# Patient Record
Sex: MALE | Race: WHITE | Employment: OTHER | URBAN - METROPOLITAN AREA
[De-identification: names, ages, dates, MRNs, and addresses within clinical notes are randomized per-mention and may not be internally consistent; named-entity substitution may affect disease eponyms.]

---

## 2020-08-11 ENCOUNTER — TRANSFERRED RECORDS (OUTPATIENT)
Dept: HEALTH INFORMATION MANAGEMENT | Facility: CLINIC | Age: 85
End: 2020-08-11

## 2020-08-26 ENCOUNTER — TRANSFERRED RECORDS (OUTPATIENT)
Dept: HEALTH INFORMATION MANAGEMENT | Facility: CLINIC | Age: 85
End: 2020-08-26

## 2020-09-08 DIAGNOSIS — Z11.59 ENCOUNTER FOR SCREENING FOR OTHER VIRAL DISEASES: Primary | ICD-10-CM

## 2020-09-09 ENCOUNTER — TRANSFERRED RECORDS (OUTPATIENT)
Dept: HEALTH INFORMATION MANAGEMENT | Facility: CLINIC | Age: 85
End: 2020-09-09

## 2020-09-17 DIAGNOSIS — Z11.59 ENCOUNTER FOR SCREENING FOR OTHER VIRAL DISEASES: ICD-10-CM

## 2020-09-17 PROCEDURE — U0003 INFECTIOUS AGENT DETECTION BY NUCLEIC ACID (DNA OR RNA); SEVERE ACUTE RESPIRATORY SYNDROME CORONAVIRUS 2 (SARS-COV-2) (CORONAVIRUS DISEASE [COVID-19]), AMPLIFIED PROBE TECHNIQUE, MAKING USE OF HIGH THROUGHPUT TECHNOLOGIES AS DESCRIBED BY CMS-2020-01-R: HCPCS | Performed by: ORTHOPAEDIC SURGERY

## 2020-09-18 LAB
SARS-COV-2 RNA SPEC QL NAA+PROBE: NOT DETECTED
SPECIMEN SOURCE: NORMAL

## 2020-09-18 RX ORDER — FLUTICASONE PROPIONATE 50 MCG
2 SPRAY, SUSPENSION (ML) NASAL DAILY PRN
COMMUNITY

## 2020-09-18 RX ORDER — WARFARIN SODIUM 5 MG/1
2.5 TABLET ORAL DAILY
COMMUNITY

## 2020-09-18 RX ORDER — IPRATROPIUM BROMIDE 21 UG/1
2 SPRAY, METERED NASAL 2 TIMES DAILY
COMMUNITY

## 2020-09-18 RX ORDER — SPIRONOLACTONE 25 MG/1
25 TABLET ORAL 2 TIMES DAILY
COMMUNITY

## 2020-09-18 RX ORDER — WARFARIN SODIUM 5 MG/1
5 TABLET ORAL DAILY
COMMUNITY

## 2020-09-21 ENCOUNTER — DOCUMENTATION ONLY (OUTPATIENT)
Dept: OTHER | Facility: CLINIC | Age: 85
End: 2020-09-21

## 2020-09-21 ENCOUNTER — HOSPITAL ENCOUNTER (OUTPATIENT)
Facility: CLINIC | Age: 85
Discharge: HOME | End: 2020-09-22
Attending: ORTHOPAEDIC SURGERY | Admitting: ORTHOPAEDIC SURGERY
Payer: COMMERCIAL

## 2020-09-21 ENCOUNTER — ANESTHESIA EVENT (OUTPATIENT)
Dept: SURGERY | Facility: CLINIC | Age: 85
End: 2020-09-21
Payer: COMMERCIAL

## 2020-09-21 ENCOUNTER — APPOINTMENT (OUTPATIENT)
Dept: GENERAL RADIOLOGY | Facility: CLINIC | Age: 85
End: 2020-09-21
Attending: ORTHOPAEDIC SURGERY
Payer: COMMERCIAL

## 2020-09-21 ENCOUNTER — ANESTHESIA (OUTPATIENT)
Dept: SURGERY | Facility: CLINIC | Age: 85
End: 2020-09-21
Payer: COMMERCIAL

## 2020-09-21 PROBLEM — M48.062 SPINAL STENOSIS, LUMBAR REGION, WITH NEUROGENIC CLAUDICATION: Status: ACTIVE | Noted: 2020-09-21

## 2020-09-21 LAB
ABO + RH BLD: NORMAL
ABO + RH BLD: NORMAL
BLD GP AB SCN SERPL QL: NORMAL
BLOOD BANK CMNT PATIENT-IMP: NORMAL
INR PPP: 1.11 (ref 0.86–1.14)
SPECIMEN EXP DATE BLD: NORMAL

## 2020-09-21 PROCEDURE — 27210794 ZZH OR GENERAL SUPPLY STERILE: Performed by: ORTHOPAEDIC SURGERY

## 2020-09-21 PROCEDURE — 37000009 ZZH ANESTHESIA TECHNICAL FEE, EACH ADDTL 15 MIN: Performed by: ORTHOPAEDIC SURGERY

## 2020-09-21 PROCEDURE — 25000128 H RX IP 250 OP 636: Performed by: SURGERY

## 2020-09-21 PROCEDURE — 25800030 ZZH RX IP 258 OP 636: Performed by: NURSE ANESTHETIST, CERTIFIED REGISTERED

## 2020-09-21 PROCEDURE — 25000128 H RX IP 250 OP 636: Performed by: ORTHOPAEDIC SURGERY

## 2020-09-21 PROCEDURE — 25000566 ZZH SEVOFLURANE, EA 15 MIN: Performed by: ORTHOPAEDIC SURGERY

## 2020-09-21 PROCEDURE — 86850 RBC ANTIBODY SCREEN: CPT | Performed by: ORTHOPAEDIC SURGERY

## 2020-09-21 PROCEDURE — 25000128 H RX IP 250 OP 636: Performed by: NURSE ANESTHETIST, CERTIFIED REGISTERED

## 2020-09-21 PROCEDURE — 86901 BLOOD TYPING SEROLOGIC RH(D): CPT | Performed by: ORTHOPAEDIC SURGERY

## 2020-09-21 PROCEDURE — 37000008 ZZH ANESTHESIA TECHNICAL FEE, 1ST 30 MIN: Performed by: ORTHOPAEDIC SURGERY

## 2020-09-21 PROCEDURE — 36000063 ZZH SURGERY LEVEL 4 EA 15 ADDTL MIN: Performed by: ORTHOPAEDIC SURGERY

## 2020-09-21 PROCEDURE — 40000170 ZZH STATISTIC PRE-PROCEDURE ASSESSMENT II: Performed by: ORTHOPAEDIC SURGERY

## 2020-09-21 PROCEDURE — 40000985 XR LUMBAR SPINE PORT 1 VW

## 2020-09-21 PROCEDURE — 36415 COLL VENOUS BLD VENIPUNCTURE: CPT | Performed by: ORTHOPAEDIC SURGERY

## 2020-09-21 PROCEDURE — 71000012 ZZH RECOVERY PHASE 1 LEVEL 1 FIRST HR: Performed by: ORTHOPAEDIC SURGERY

## 2020-09-21 PROCEDURE — 25000125 ZZHC RX 250: Performed by: NURSE ANESTHETIST, CERTIFIED REGISTERED

## 2020-09-21 PROCEDURE — 85610 PROTHROMBIN TIME: CPT | Performed by: ORTHOPAEDIC SURGERY

## 2020-09-21 PROCEDURE — 25000132 ZZH RX MED GY IP 250 OP 250 PS 637: Performed by: ORTHOPAEDIC SURGERY

## 2020-09-21 PROCEDURE — 36000093 ZZH SURGERY LEVEL 4 1ST 30 MIN: Performed by: ORTHOPAEDIC SURGERY

## 2020-09-21 PROCEDURE — 86900 BLOOD TYPING SEROLOGIC ABO: CPT | Performed by: ORTHOPAEDIC SURGERY

## 2020-09-21 PROCEDURE — 25000125 ZZHC RX 250: Performed by: ORTHOPAEDIC SURGERY

## 2020-09-21 RX ORDER — FENTANYL CITRATE 50 UG/ML
25-50 INJECTION, SOLUTION INTRAMUSCULAR; INTRAVENOUS
Status: DISCONTINUED | OUTPATIENT
Start: 2020-09-21 | End: 2020-09-21 | Stop reason: HOSPADM

## 2020-09-21 RX ORDER — METOCLOPRAMIDE 5 MG/1
5 TABLET ORAL EVERY 6 HOURS PRN
Status: DISCONTINUED | OUTPATIENT
Start: 2020-09-21 | End: 2020-09-22 | Stop reason: HOSPADM

## 2020-09-21 RX ORDER — DEXAMETHASONE SODIUM PHOSPHATE 4 MG/ML
INJECTION, SOLUTION INTRA-ARTICULAR; INTRALESIONAL; INTRAMUSCULAR; INTRAVENOUS; SOFT TISSUE PRN
Status: DISCONTINUED | OUTPATIENT
Start: 2020-09-21 | End: 2020-09-21

## 2020-09-21 RX ORDER — HYDROMORPHONE HYDROCHLORIDE 1 MG/ML
.3-.5 INJECTION, SOLUTION INTRAMUSCULAR; INTRAVENOUS; SUBCUTANEOUS
Status: DISCONTINUED | OUTPATIENT
Start: 2020-09-21 | End: 2020-09-22 | Stop reason: HOSPADM

## 2020-09-21 RX ORDER — OXYCODONE HYDROCHLORIDE 5 MG/1
5-10 TABLET ORAL EVERY 4 HOURS PRN
Status: DISCONTINUED | OUTPATIENT
Start: 2020-09-21 | End: 2020-09-22 | Stop reason: HOSPADM

## 2020-09-21 RX ORDER — SODIUM CHLORIDE, SODIUM LACTATE, POTASSIUM CHLORIDE, CALCIUM CHLORIDE 600; 310; 30; 20 MG/100ML; MG/100ML; MG/100ML; MG/100ML
INJECTION, SOLUTION INTRAVENOUS CONTINUOUS
Status: DISCONTINUED | OUTPATIENT
Start: 2020-09-21 | End: 2020-09-21 | Stop reason: HOSPADM

## 2020-09-21 RX ORDER — PROPOFOL 10 MG/ML
INJECTION, EMULSION INTRAVENOUS PRN
Status: DISCONTINUED | OUTPATIENT
Start: 2020-09-21 | End: 2020-09-21

## 2020-09-21 RX ORDER — MINOXIDIL 2.5 MG/1
5 TABLET ORAL 2 TIMES DAILY
Status: DISCONTINUED | OUTPATIENT
Start: 2020-09-21 | End: 2020-09-22 | Stop reason: HOSPADM

## 2020-09-21 RX ORDER — VECURONIUM BROMIDE 1 MG/ML
INJECTION, POWDER, LYOPHILIZED, FOR SOLUTION INTRAVENOUS PRN
Status: DISCONTINUED | OUTPATIENT
Start: 2020-09-21 | End: 2020-09-21

## 2020-09-21 RX ORDER — ATENOLOL 100 MG/1
100 TABLET ORAL DAILY
Status: DISCONTINUED | OUTPATIENT
Start: 2020-09-22 | End: 2020-09-22 | Stop reason: HOSPADM

## 2020-09-21 RX ORDER — ACETAMINOPHEN 325 MG/1
650 TABLET ORAL EVERY 4 HOURS PRN
Status: DISCONTINUED | OUTPATIENT
Start: 2020-09-24 | End: 2020-09-22 | Stop reason: HOSPADM

## 2020-09-21 RX ORDER — PROPOFOL 10 MG/ML
INJECTION, EMULSION INTRAVENOUS CONTINUOUS PRN
Status: DISCONTINUED | OUTPATIENT
Start: 2020-09-21 | End: 2020-09-21

## 2020-09-21 RX ORDER — CEFAZOLIN SODIUM 1 G/3ML
1 INJECTION, POWDER, FOR SOLUTION INTRAMUSCULAR; INTRAVENOUS EVERY 8 HOURS
Status: DISCONTINUED | OUTPATIENT
Start: 2020-09-21 | End: 2020-09-21

## 2020-09-21 RX ORDER — NEOSTIGMINE METHYLSULFATE 1 MG/ML
VIAL (ML) INJECTION PRN
Status: DISCONTINUED | OUTPATIENT
Start: 2020-09-21 | End: 2020-09-21

## 2020-09-21 RX ORDER — CEFAZOLIN SODIUM 1 G/3ML
1 INJECTION, POWDER, FOR SOLUTION INTRAMUSCULAR; INTRAVENOUS SEE ADMIN INSTRUCTIONS
Status: DISCONTINUED | OUTPATIENT
Start: 2020-09-21 | End: 2020-09-21 | Stop reason: HOSPADM

## 2020-09-21 RX ORDER — MEPERIDINE HYDROCHLORIDE 25 MG/ML
12.5 INJECTION INTRAMUSCULAR; INTRAVENOUS; SUBCUTANEOUS
Status: DISCONTINUED | OUTPATIENT
Start: 2020-09-21 | End: 2020-09-21 | Stop reason: HOSPADM

## 2020-09-21 RX ORDER — METOCLOPRAMIDE HYDROCHLORIDE 5 MG/ML
5 INJECTION INTRAMUSCULAR; INTRAVENOUS EVERY 6 HOURS PRN
Status: DISCONTINUED | OUTPATIENT
Start: 2020-09-21 | End: 2020-09-22 | Stop reason: HOSPADM

## 2020-09-21 RX ORDER — ACETAMINOPHEN 325 MG/1
975 TABLET ORAL EVERY 8 HOURS
Status: DISCONTINUED | OUTPATIENT
Start: 2020-09-21 | End: 2020-09-22 | Stop reason: HOSPADM

## 2020-09-21 RX ORDER — FLUTICASONE PROPIONATE 50 MCG
2 SPRAY, SUSPENSION (ML) NASAL DAILY PRN
Status: DISCONTINUED | OUTPATIENT
Start: 2020-09-21 | End: 2020-09-22 | Stop reason: HOSPADM

## 2020-09-21 RX ORDER — MAGNESIUM HYDROXIDE 1200 MG/15ML
LIQUID ORAL PRN
Status: DISCONTINUED | OUTPATIENT
Start: 2020-09-21 | End: 2020-09-21 | Stop reason: HOSPADM

## 2020-09-21 RX ORDER — LIDOCAINE HYDROCHLORIDE 20 MG/ML
INJECTION, SOLUTION INFILTRATION; PERINEURAL PRN
Status: DISCONTINUED | OUTPATIENT
Start: 2020-09-21 | End: 2020-09-21

## 2020-09-21 RX ORDER — BUPIVACAINE HYDROCHLORIDE AND EPINEPHRINE 5; 5 MG/ML; UG/ML
INJECTION, SOLUTION PERINEURAL PRN
Status: DISCONTINUED | OUTPATIENT
Start: 2020-09-21 | End: 2020-09-21 | Stop reason: HOSPADM

## 2020-09-21 RX ORDER — IPRATROPIUM BROMIDE 21 UG/1
2 SPRAY, METERED NASAL 2 TIMES DAILY
Status: DISCONTINUED | OUTPATIENT
Start: 2020-09-21 | End: 2020-09-22 | Stop reason: HOSPADM

## 2020-09-21 RX ORDER — ONDANSETRON 4 MG/1
4 TABLET, ORALLY DISINTEGRATING ORAL EVERY 6 HOURS PRN
Status: DISCONTINUED | OUTPATIENT
Start: 2020-09-21 | End: 2020-09-22 | Stop reason: HOSPADM

## 2020-09-21 RX ORDER — NALOXONE HYDROCHLORIDE 0.4 MG/ML
.1-.4 INJECTION, SOLUTION INTRAMUSCULAR; INTRAVENOUS; SUBCUTANEOUS
Status: DISCONTINUED | OUTPATIENT
Start: 2020-09-21 | End: 2020-09-21 | Stop reason: HOSPADM

## 2020-09-21 RX ORDER — LIDOCAINE 40 MG/G
CREAM TOPICAL
Status: DISCONTINUED | OUTPATIENT
Start: 2020-09-21 | End: 2020-09-22 | Stop reason: HOSPADM

## 2020-09-21 RX ORDER — DUTASTERIDE 0.5 MG/1
0.5 CAPSULE, LIQUID FILLED ORAL AT BEDTIME
Status: DISCONTINUED | OUTPATIENT
Start: 2020-09-21 | End: 2020-09-22 | Stop reason: HOSPADM

## 2020-09-21 RX ORDER — ONDANSETRON 2 MG/ML
4 INJECTION INTRAMUSCULAR; INTRAVENOUS EVERY 30 MIN PRN
Status: DISCONTINUED | OUTPATIENT
Start: 2020-09-21 | End: 2020-09-21 | Stop reason: HOSPADM

## 2020-09-21 RX ORDER — GLYCOPYRROLATE 0.2 MG/ML
INJECTION, SOLUTION INTRAMUSCULAR; INTRAVENOUS PRN
Status: DISCONTINUED | OUTPATIENT
Start: 2020-09-21 | End: 2020-09-21

## 2020-09-21 RX ORDER — ATORVASTATIN CALCIUM 20 MG/1
20 TABLET, FILM COATED ORAL AT BEDTIME
Status: DISCONTINUED | OUTPATIENT
Start: 2020-09-21 | End: 2020-09-22 | Stop reason: HOSPADM

## 2020-09-21 RX ORDER — ONDANSETRON 4 MG/1
4 TABLET, ORALLY DISINTEGRATING ORAL EVERY 30 MIN PRN
Status: DISCONTINUED | OUTPATIENT
Start: 2020-09-21 | End: 2020-09-21 | Stop reason: HOSPADM

## 2020-09-21 RX ORDER — FENTANYL CITRATE 50 UG/ML
INJECTION, SOLUTION INTRAMUSCULAR; INTRAVENOUS PRN
Status: DISCONTINUED | OUTPATIENT
Start: 2020-09-21 | End: 2020-09-21

## 2020-09-21 RX ORDER — ONDANSETRON 2 MG/ML
INJECTION INTRAMUSCULAR; INTRAVENOUS PRN
Status: DISCONTINUED | OUTPATIENT
Start: 2020-09-21 | End: 2020-09-21

## 2020-09-21 RX ORDER — CEFAZOLIN SODIUM 2 G/100ML
2 INJECTION, SOLUTION INTRAVENOUS
Status: COMPLETED | OUTPATIENT
Start: 2020-09-21 | End: 2020-09-21

## 2020-09-21 RX ORDER — ONDANSETRON 2 MG/ML
4 INJECTION INTRAMUSCULAR; INTRAVENOUS EVERY 6 HOURS PRN
Status: DISCONTINUED | OUTPATIENT
Start: 2020-09-21 | End: 2020-09-22 | Stop reason: HOSPADM

## 2020-09-21 RX ORDER — EPHEDRINE SULFATE 50 MG/ML
INJECTION, SOLUTION INTRAMUSCULAR; INTRAVENOUS; SUBCUTANEOUS PRN
Status: DISCONTINUED | OUTPATIENT
Start: 2020-09-21 | End: 2020-09-21

## 2020-09-21 RX ORDER — SODIUM CHLORIDE, SODIUM LACTATE, POTASSIUM CHLORIDE, CALCIUM CHLORIDE 600; 310; 30; 20 MG/100ML; MG/100ML; MG/100ML; MG/100ML
INJECTION, SOLUTION INTRAVENOUS CONTINUOUS PRN
Status: DISCONTINUED | OUTPATIENT
Start: 2020-09-21 | End: 2020-09-21

## 2020-09-21 RX ORDER — CEFAZOLIN SODIUM 1 G/3ML
1 INJECTION, POWDER, FOR SOLUTION INTRAMUSCULAR; INTRAVENOUS EVERY 8 HOURS
Status: COMPLETED | OUTPATIENT
Start: 2020-09-21 | End: 2020-09-22

## 2020-09-21 RX ORDER — SPIRONOLACTONE 25 MG/1
25 TABLET ORAL
Status: DISCONTINUED | OUTPATIENT
Start: 2020-09-21 | End: 2020-09-22 | Stop reason: HOSPADM

## 2020-09-21 RX ORDER — NALOXONE HYDROCHLORIDE 0.4 MG/ML
.1-.4 INJECTION, SOLUTION INTRAMUSCULAR; INTRAVENOUS; SUBCUTANEOUS
Status: DISCONTINUED | OUTPATIENT
Start: 2020-09-21 | End: 2020-09-22 | Stop reason: HOSPADM

## 2020-09-21 RX ADMIN — FENTANYL CITRATE 25 MCG: 0.05 INJECTION, SOLUTION INTRAMUSCULAR; INTRAVENOUS at 13:09

## 2020-09-21 RX ADMIN — PHENYLEPHRINE HYDROCHLORIDE 100 MCG: 10 INJECTION INTRAVENOUS at 10:26

## 2020-09-21 RX ADMIN — ATORVASTATIN CALCIUM 20 MG: 20 TABLET, FILM COATED ORAL at 21:49

## 2020-09-21 RX ADMIN — Medication 5 MG: at 10:50

## 2020-09-21 RX ADMIN — CEFAZOLIN 1 G: 1 INJECTION, POWDER, FOR SOLUTION INTRAMUSCULAR; INTRAVENOUS at 20:52

## 2020-09-21 RX ADMIN — CEFAZOLIN SODIUM 2 G: 2 INJECTION, SOLUTION INTRAVENOUS at 10:16

## 2020-09-21 RX ADMIN — PHENYLEPHRINE HYDROCHLORIDE 150 MCG: 10 INJECTION INTRAVENOUS at 10:41

## 2020-09-21 RX ADMIN — PHENYLEPHRINE HYDROCHLORIDE 100 MCG: 10 INJECTION INTRAVENOUS at 11:50

## 2020-09-21 RX ADMIN — FENTANYL CITRATE 50 MCG: 50 INJECTION, SOLUTION INTRAMUSCULAR; INTRAVENOUS at 10:07

## 2020-09-21 RX ADMIN — ROCURONIUM BROMIDE 50 MG: 10 INJECTION INTRAVENOUS at 10:07

## 2020-09-21 RX ADMIN — DEXAMETHASONE SODIUM PHOSPHATE 4 MG: 4 INJECTION, SOLUTION INTRA-ARTICULAR; INTRALESIONAL; INTRAMUSCULAR; INTRAVENOUS; SOFT TISSUE at 10:24

## 2020-09-21 RX ADMIN — PROPOFOL 100 MG: 10 INJECTION, EMULSION INTRAVENOUS at 10:07

## 2020-09-21 RX ADMIN — VECURONIUM BROMIDE 1 MG: 1 INJECTION, POWDER, LYOPHILIZED, FOR SOLUTION INTRAVENOUS at 11:55

## 2020-09-21 RX ADMIN — GLYCOPYRROLATE 0.8 MG: 0.2 INJECTION, SOLUTION INTRAMUSCULAR; INTRAVENOUS at 12:22

## 2020-09-21 RX ADMIN — LIDOCAINE HYDROCHLORIDE 100 MG: 20 INJECTION, SOLUTION INFILTRATION; PERINEURAL at 10:07

## 2020-09-21 RX ADMIN — FENTANYL CITRATE 50 MCG: 50 INJECTION, SOLUTION INTRAMUSCULAR; INTRAVENOUS at 10:31

## 2020-09-21 RX ADMIN — PHENYLEPHRINE HYDROCHLORIDE 100 MCG: 10 INJECTION INTRAVENOUS at 11:07

## 2020-09-21 RX ADMIN — PHENYLEPHRINE HYDROCHLORIDE 100 MCG: 10 INJECTION INTRAVENOUS at 10:35

## 2020-09-21 RX ADMIN — ROCURONIUM BROMIDE 20 MG: 10 INJECTION INTRAVENOUS at 10:32

## 2020-09-21 RX ADMIN — PHENYLEPHRINE HYDROCHLORIDE 100 MCG: 10 INJECTION INTRAVENOUS at 11:17

## 2020-09-21 RX ADMIN — ONDANSETRON 4 MG: 2 INJECTION INTRAMUSCULAR; INTRAVENOUS at 12:08

## 2020-09-21 RX ADMIN — HYDROMORPHONE HYDROCHLORIDE 0.5 MG: 1 INJECTION, SOLUTION INTRAMUSCULAR; INTRAVENOUS; SUBCUTANEOUS at 12:38

## 2020-09-21 RX ADMIN — FENTANYL CITRATE 25 MCG: 0.05 INJECTION, SOLUTION INTRAMUSCULAR; INTRAVENOUS at 13:01

## 2020-09-21 RX ADMIN — SODIUM CHLORIDE, POTASSIUM CHLORIDE, SODIUM LACTATE AND CALCIUM CHLORIDE: 600; 310; 30; 20 INJECTION, SOLUTION INTRAVENOUS at 10:04

## 2020-09-21 RX ADMIN — PHENYLEPHRINE HYDROCHLORIDE 100 MCG: 10 INJECTION INTRAVENOUS at 10:23

## 2020-09-21 RX ADMIN — SPIRONOLACTONE 25 MG: 25 TABLET ORAL at 15:42

## 2020-09-21 RX ADMIN — SODIUM CHLORIDE, POTASSIUM CHLORIDE, SODIUM LACTATE AND CALCIUM CHLORIDE: 600; 310; 30; 20 INJECTION, SOLUTION INTRAVENOUS at 11:26

## 2020-09-21 RX ADMIN — DUTASTERIDE 0.5 MG: 0.5 CAPSULE, LIQUID FILLED ORAL at 21:49

## 2020-09-21 RX ADMIN — MINOXIDIL 5 MG: 2.5 TABLET ORAL at 20:52

## 2020-09-21 RX ADMIN — VECURONIUM BROMIDE 1 MG: 1 INJECTION, POWDER, LYOPHILIZED, FOR SOLUTION INTRAVENOUS at 11:45

## 2020-09-21 RX ADMIN — PHENYLEPHRINE HYDROCHLORIDE 100 MCG: 10 INJECTION INTRAVENOUS at 10:50

## 2020-09-21 RX ADMIN — CEFAZOLIN SODIUM 1 G: 2 INJECTION, SOLUTION INTRAVENOUS at 12:08

## 2020-09-21 RX ADMIN — PHENYLEPHRINE HYDROCHLORIDE 100 MCG: 10 INJECTION INTRAVENOUS at 12:09

## 2020-09-21 RX ADMIN — ROCURONIUM BROMIDE 30 MG: 10 INJECTION INTRAVENOUS at 10:26

## 2020-09-21 RX ADMIN — NEOSTIGMINE METHYLSULFATE 5 MG: 1 INJECTION, SOLUTION INTRAVENOUS at 12:22

## 2020-09-21 RX ADMIN — PHENYLEPHRINE HYDROCHLORIDE 0.4 MCG/KG/MIN: 10 INJECTION INTRAVENOUS at 10:45

## 2020-09-21 RX ADMIN — ACETAMINOPHEN 975 MG: 325 TABLET, FILM COATED ORAL at 15:04

## 2020-09-21 RX ADMIN — PROPOFOL 100 MCG/KG/MIN: 10 INJECTION, EMULSION INTRAVENOUS at 10:20

## 2020-09-21 ASSESSMENT — MIFFLIN-ST. JEOR: SCORE: 1703.7

## 2020-09-21 ASSESSMENT — ENCOUNTER SYMPTOMS: DYSRHYTHMIAS: 1

## 2020-09-21 ASSESSMENT — LIFESTYLE VARIABLES: TOBACCO_USE: 1

## 2020-09-21 NOTE — OR NURSING
Report called to Moun[facility] CHANDLER sta [address].  Pt CPAP and 1 bag of belongings sent with pt.  Pt hooked up to capnography and transferred in stable condition.  Pt remains alert and oriented x 4.  Pt states his pain is down to a 3 and he is comfortable.

## 2020-09-21 NOTE — OP NOTE
Procedure Date: 09/21/2020      PREOPERATIVE DIAGNOSES:  Severe spinal stenosis L4 through S1 with calcified right L5 to S1 facet cyst and neurogenic claudication and lower extremity weakness.      POSTOPERATIVE DIAGNOSES:  Severe spinal stenosis L4 through S1 with calcified right L5 to S1 facet cyst and neurogenic claudication and lower extremity weakness.      PROCEDURES PERFORMED:  Partial L3, L4, L5 laminectomy with bilateral hemifacetectomies, foraminotomies, excision of calcified epidural facet cyst, right L5 to S1.      SURGEON:  Jan Ferraro MD      ASSISTANT:  Gilberto Escalera MD      DESCRIPTION OF SURGERY:  The patient was placed under general anesthesia.  After assurance of adequate anesthetic levels, turned prone on the Mukesh table.  His back was prepped and draped in the usual fashion.  He did receive 2 grams of cefazolin and this was circulating at the time of incision.  A straight midline incision was made and carried down sharply.  A subperiosteal dissection was carried out over the lamina of L3, L4 and L5 and the proximal sacrum.  A lateral radiograph was taken with a Kocher clamp directed to the L4 pedicle, confirming anatomic placement.  Attention was now directed to decompression.  Of note, he was found to have significant facet arthrosis.  The dissection was carried lateral to the level of the facets, although the facet capsules were not released.  The caudal aspect of the spinous process of L3, the spinous process of L4 and L5 were removed.  The caudal aspect of the lamina of L3 along with the medial aspect of the inferior articular facet, the L4 and L5 lamina were now thinned with a Midas Ravinder.  A laminectomy was performed beginning caudally.  He was found to have severe stenosis centrally at L4 to L5 extending laterally to the lateral recess at least moderate if not moderately severe stenosis at the L3 to L4 level.  The decompression was carried to the medial border of the L4, L5 and S1  pedicles.  He was found to have marked lateral recess stenosis at the L5 to S1 level.  On the right side at L5 to S1, he had a very large calcified facet cyst which was directly dorsal to the right S1 root causing severe compression.  This was gradually mobilized and removed to the medial border of the S1 pedicle with excellent decompression of the main thecal sac and S1 root.  Final radiograph was taken with a Penfield 4 dissector directed at the proximal portion of the decompression at the L3 to L4 disk.  The wound throughout the procedure was irrigated copiously.  A deep Hemovac drain was placed and the fascia closed with 0 Vicryl, the subcutaneous tissue closed with 2-0 Vicryl and the skin with a running subcuticular 3-0 undyed Vicryl.  Steri-Strips followed by a sterile compressive dressing were applied.  He was turned back to the supine position, awakened, extubated and taken to the recovery room in stable condition.      ESTIMATED BLOOD LOSS:  Less than 50 mL      SPECIMEN:  The ligamentum flavum and lamina.      COMPLICATIONS:  There were no intraoperative complications.         CALLI CANO MD             D: 2020   T: 2020   MT: [id number]      Name:     BENJAMIN RICHARDSON   MRN:      [id number]        Account:        [id number]   :      1935           Procedure Date: 2020      Document: [id number]       cc: Gilberto Escalera MD

## 2020-09-21 NOTE — PROGRESS NOTES
Medication History Completed by Medication Scribe  Admission medication history interview status for the 9/21/2020  admission is complete. See EPIC admission navigator for prior to admission medications     Medication history sources: Patient's family/friend (Daughter), Surescripts, H&P and Patient's home med list  Medication history source reliability: Moderate  Adherence assessment: N/A Not Observed    Significant changes made to the medication list:  Patient reports no longer taking the following meds (med scribe removed from PTA med list): metolazone, potassium chloride, memantine, tramadol      Additional medication history information:   Patient's daughter brought medications and was informed of hospital policy and was going to bring them back home.    Medication reconciliation completed by provider prior to medication history? No    Time spent in this activity: 30 minutes      Prior to Admission medications    Medication Sig Last Dose Taking? Auth Provider   Acetaminophen (TYLENOL PO) Take 1,000 mg by mouth daily as needed for mild pain or fever 9/20/2020 at pm Yes Reported, Patient   atenolol (TENORMIN) 100 MG tablet Take 100 mg by mouth daily  9/21/2020 at 0915 Yes Reported, Patient   atorvastatin (LIPITOR) 20 MG tablet Take 20 mg by mouth At Bedtime  9/21/2020 at 0915 Yes Reported, Patient   dutasteride (AVODART) 0.5 MG capsule Take 0.5 mg by mouth At Bedtime 9/20/2020 at pm Yes Reported, Patient   fluticasone (FLONASE) 50 MCG/ACT nasal spray Spray 2 sprays into both nostrils daily as needed  9/20/2020 at PM Yes Reported, Patient   ipratropium (ATROVENT) 0.03 % nasal spray Spray 2 sprays into both nostrils 2 times daily 9/20/2020 at PM Yes Reported, Patient   minoxidil (LONITEN) 2.5 MG tablet Take 5 mg by mouth 2 times daily (takes 2 x 2.5mg tablet)  9/21/2020 at 0915 Yes Reported, Patient   Misc Natural Products (COLON CARE PO) Take 1 Dose by mouth daily 9/20/2020 at AM Yes Reported, Patient   Psyllium  (METAMUCIL PO) Take 1 Dose by mouth daily (with breakfast) 9/20/2020 at AM Yes Reported, Patient   spironolactone (ALDACTONE) 25 MG tablet Take 25 mg by mouth 2 times daily  9/21/2020 at 0915 Yes Reported, Patient   Vitamin D (Cholecalciferol) 25 MCG (1000 UT) TABS Take 1,000 Units by mouth 2 times daily  9/20/2020 at pm Yes Reported, Patient   warfarin ANTICOAGULANT (COUMADIN) 5 MG tablet Take 2.5 mg by mouth daily (0.5 X 5 mg)  Tuesday, Wednesday, Friday and Sunday Past Week at 9/16 Yes Reported, Patient   warfarin ANTICOAGULANT (COUMADIN) 5 MG tablet Take 5 mg by mouth daily Monday, Thursday and Saturday Past Week at 9/15 Yes Reported, Patient

## 2020-09-21 NOTE — OR NURSING
bilat pedal pulses palpable, bilateral leg strength equal-strong. Pt states occasional numbness both feet, some pain left calf.

## 2020-09-21 NOTE — ANESTHESIA CARE TRANSFER NOTE
Patient: Miguelangel Howard    Procedure(s):  LUMBAR 4-SACRAL DECOMPRESSION    Diagnosis: Spinal stenosis, lumbar region, without neurogenic claudication [M48.061]  Diagnosis Additional Information: No value filed.    Anesthesia Type:   General     Note:  Airway :Face Mask  Patient transferred to:PACU  Comments: Neuromuscular blockade reversed after TOF 1/4, spontaneous respirations, adequate tidal volumes, followed commands to voice, oropharynx suctioned with soft flexible catheter, extubated atraumatically, extubated with suction, airway patent after extubation.  Oxygen via facemask at 6 liters per minute to PACU. Oxygen tubing connected to wall O2 in PACU, SpO2, NiBP, and EKG monitors and alarms on and functioning, report on patient's clinical status given to PACU RN, RN questions answered.   Handoff Report: Identifed the Patient, Identified the Reponsible Provider, Reviewed the pertinent medical history, Discussed the surgical course, Reviewed Intra-OP anesthesia mangement and issues during anesthesia, Set expectations for post-procedure period and Allowed opportunity for questions and acknowledgement of understanding      Vitals: (Last set prior to Anesthesia Care Transfer)    CRNA VITALS  9/21/2020 1204 - 9/21/2020 1240      9/21/2020             NIBP:  99/73    NIBP Mean:  88                Electronically Signed By: ZHANE Ott CRNA  September 21, 2020  12:40 PM

## 2020-09-21 NOTE — ANESTHESIA PREPROCEDURE EVALUATION
Anesthesia Pre-Procedure Evaluation    Patient: Miguelangel Howard   MRN: [id number] : 1935          Preoperative Diagnosis: Spinal stenosis, lumbar region, without neurogenic claudication [M48.061]    Procedure(s):  LUMBAR 4-SACRAL DECOMPRESSION    Past Medical History:   Diagnosis Date     A-fib (H)      BPH (benign prostatic hyperplasia)      Colon polyps      Dementia (H)      Erectile dysfunction      Hyperlipidemia      Hypertension      Hypokalemia      Obese      Pacemaker      Sleep apnea      Past Surgical History:   Procedure Laterality Date     CYSTOSCOPY, TRANSURETHRAL RESECTION (TUR) PROSTATE, COMBINED N/A 2016    Procedure: COMBINED CYSTOSCOPY, TRANSURETHRAL RESECTION (TUR) PROSTATE;  Surgeon: Vick Mooney MD;  Location: [facility] OR     GENITOURINARY SURGERY       IMPLANT PACEMAKER       Allergies   Allergen Reactions     Iodine      Topical iodine=rask  Betadine is OK     Social History     Tobacco Use     Smoking status: Former Smoker   Substance Use Topics     Alcohol use: No     Prior to Admission medications    Medication Sig Start Date End Date Taking? Authorizing Provider   fluticasone (FLONASE) 50 MCG/ACT nasal spray Spray 1 spray into both nostrils daily   Yes Reported, Patient   ipratropium (ATROVENT) 0.03 % nasal spray Spray 2 sprays into both nostrils 2 times daily   Yes Reported, Patient   IPRATROPIUM BROMIDE HFA IN    Yes Reported, Patient   spironolactone (ALDACTONE) 25 MG tablet Take 25 mg by mouth daily   Yes Reported, Patient   warfarin ANTICOAGULANT (COUMADIN) 1 MG tablet Take 2.5 mg by mouth daily Tuesday, Wednesday, Friday and    Yes Reported, Patient   warfarin ANTICOAGULANT (COUMADIN) 5 MG tablet Take 5 mg by mouth daily Monday, Thursday and Saturday   Yes Reported, Patient   Acetaminophen (TYLENOL PO) Take 1,000 mg by mouth daily as needed for mild pain or fever    Reported, Patient   ATENOLOL PO Take 100 mg by mouth daily    Reported, Patient   Atorvastatin  Calcium (LIPITOR PO) Take 20 mg by mouth At Bedtime    Reported, Patient   dutasteride (AVODART) 0.5 MG capsule Take 0.5 mg by mouth At Bedtime    Reported, Patient   METOLAZONE PO Take 2.5 mg by mouth four times a week Tues, Thurs, Sat, Sun  (takes 0.5 x 5mg tablets)    Reported, Patient   MINOXIDIL PO Take 5 mg by mouth 2 times daily (takes 2 x 2.5mg tablet)    Reported, Patient   NONFORMULARY every morning Ultimate colon care formula dietary supplement    Reported, Patient   Potassium Chloride (KLOR-CON PO) Take 40 mEq by mouth 2 times daily (Takes 2 x 20 meq tablet)    Reported, Patient   Psyllium (METAMUCIL PO) Take 1 Dose by mouth daily (with breakfast)    Reported, Patient   VITAMIN D, CHOLECALCIFEROL, PO Take 1,000 Units by mouth 2 times daily    Reported, Patient     Current Facility-Administered Medications Ordered in Epic   Medication Dose Route Frequency Last Rate Last Dose     ceFAZolin (ANCEF) 1 g vial to attach to  ml bag for ADULT or 50 ml bag for PEDS  1 g Intravenous See Admin Instructions         ceFAZolin (ANCEF) intermittent infusion 2 g in 100 mL dextrose PRE-MIX  2 g Intravenous Pre-Op/Pre-procedure x 1 dose         No current [facility]-ordered outpatient medications on file.       No results for input(s): NA, POTASSIUM, CHLORIDE, CO2, ANIONGAP, GLC, BUN, CR, RADHA in the last 84686 hours.  No results for input(s): WBC, HGB, PLT in the last 50728 hours.  Recent Labs   Lab Test 09/21/20  0851 04/20/16  0624   ABO PENDING O   RH  --   Pos     No results for input(s): TROPI in the last 29411 hours.  No results for input(s): PH, PCO2, PO2, HCO3 in the last 02242 hours.  No results for input(s): HCG in the last 61752 hours.  No results found for this or any previous visit (from the past 744 hour(s)).  No results found for this or any previous visit (from the past 4320 hour(s)).      RECENT LABS:       Anesthesia Evaluation     .             ROS/MED HX    ENT/Pulmonary:     (+)sleep apnea, tobacco  "use, Past use , . .    Neurologic:     (+)dementia,     Cardiovascular:     (+) Dyslipidemia, hypertension----. : . . . pacemaker :. dysrhythmias a-fib, .       METS/Exercise Tolerance:     Hematologic:         Musculoskeletal:         GI/Hepatic:        (-) GERD   Renal/Genitourinary:     (+) BPH,       Endo:     (+) Obesity, .      Psychiatric:         Infectious Disease:         Malignancy:         Other:                          Physical Exam  Normal systems: cardiovascular and pulmonary    Airway   Mallampati: III  TM distance: >3 FB  Neck ROM: full    Dental   (+) partials    Cardiovascular       Pulmonary             Lab Results   Component Value Date    INR 1.13 04/20/2016       Preop Vitals  BP Readings from Last 3 Encounters:   04/21/16 133/73    Pulse Readings from Last 3 Encounters:   04/20/16 78      Resp Readings from Last 3 Encounters:   04/21/16 18    SpO2 Readings from Last 3 Encounters:   04/21/16 95%      Temp Readings from Last 1 Encounters:   04/21/16 35.6  C (96  F) (Oral)    Ht Readings from Last 1 Encounters:   04/20/16 1.803 m (5' 11\")      Wt Readings from Last 1 Encounters:   04/20/16 107.7 kg (237 lb 6.4 oz)    Estimated body mass index is 33.11 kg/m  as calculated from the following:    Height as of 4/20/16: 1.803 m (5' 11\").    Weight as of 4/20/16: 107.7 kg (237 lb 6.4 oz).       Anesthesia Plan      History & Physical Review  History and physical reviewed and following examination; no interval change.    ASA Status:  2 .    NPO Status:  > 8 hours    Plan for General (ETT) with Intravenous and Propofol induction. Maintenance will be Balanced.    PONV prophylaxis:  Ondansetron (or other 5HT-3) and Dexamethasone or Solumedrol         Postoperative Care  Postoperative pain management:  IV analgesics.      Consents  Anesthetic plan, risks, benefits and alternatives discussed with:  Patient or representative..                 Pollo Storey MD  "

## 2020-09-21 NOTE — ANESTHESIA POSTPROCEDURE EVALUATION
Patient: Miguelangel Howard    Procedure(s):  LUMBAR 4-SACRAL DECOMPRESSION    Diagnosis:Spinal stenosis, lumbar region, without neurogenic claudication [M48.061]  Diagnosis Additional Information: No value filed.    Anesthesia Type:  General    Note:  Anesthesia Post Evaluation    Patient location during evaluation: PACU  Patient participation: Able to fully participate in evaluation  Level of consciousness: sleepy but conscious and responsive to verbal stimuli  Pain management: adequate  Airway patency: patent  Cardiovascular status: acceptable and hemodynamically stable  Respiratory status: acceptable and unassisted  Hydration status: acceptable  PONV: none     Anesthetic complications: None          Last vitals:  Vitals:    09/21/20 1427 09/21/20 1500 09/21/20 1600   BP: 114/67 111/59 107/65   Pulse: 72 72    Resp: 16 17    Temp: 36.3  C (97.4  F)     SpO2: 98% 92%          Electronically Signed By: Pollo Storey MD  September 21, 2020  5:31 PM

## 2020-09-21 NOTE — PROGRESS NOTES
Patient arrived from PACU at 1430 via cart. Oriented to self and year. Forgetful. VSS on RA. Small sanguinous drainage on the bottom of the dressing on back. hemovac intact. Baseline numbness on bilateral soles. Jensen patent. PIVx1 on LUE. Daughter at bedside.

## 2020-09-21 NOTE — BRIEF OP NOTE
[facility]    Brief Operative Note    Pre-operative diagnosis: Spinal stenosis, lumbar region, without neurogenic claudication [M48.061]  Post-operative diagnosis Same as pre-operative diagnosis    Procedure: Procedure(s):  LUMBAR 4-SACRAL DECOMPRESSION  Surgeon: Surgeon(s) and Role:     * Jan Ferraro MD - Primary     * Gilberto Escalera MD - Assisting  Anesthesia: General   Estimated blood loss: Less than 50 ml  Drains: Hemovac  Specimens: * No specimens in log *  Findings:   None.  Complications: None.  Implants: * No implants in log *

## 2020-09-22 ENCOUNTER — APPOINTMENT (OUTPATIENT)
Dept: PHYSICAL THERAPY | Facility: CLINIC | Age: 85
End: 2020-09-22
Attending: ORTHOPAEDIC SURGERY
Payer: COMMERCIAL

## 2020-09-22 VITALS
WEIGHT: 217.5 LBS | HEART RATE: 88 BPM | DIASTOLIC BLOOD PRESSURE: 60 MMHG | TEMPERATURE: 97.7 F | OXYGEN SATURATION: 98 % | BODY MASS INDEX: 30.45 KG/M2 | SYSTOLIC BLOOD PRESSURE: 117 MMHG | RESPIRATION RATE: 18 BRPM | HEIGHT: 71 IN

## 2020-09-22 LAB
GLUCOSE SERPL-MCNC: 120 MG/DL (ref 70–99)
HGB BLD-MCNC: 13.3 G/DL (ref 13.3–17.7)

## 2020-09-22 PROCEDURE — 97161 PT EVAL LOW COMPLEX 20 MIN: CPT | Mod: GP | Performed by: PHYSICAL THERAPIST

## 2020-09-22 PROCEDURE — 85018 HEMOGLOBIN: CPT | Performed by: ORTHOPAEDIC SURGERY

## 2020-09-22 PROCEDURE — 97530 THERAPEUTIC ACTIVITIES: CPT | Mod: GP | Performed by: PHYSICAL THERAPIST

## 2020-09-22 PROCEDURE — 25000132 ZZH RX MED GY IP 250 OP 250 PS 637: Performed by: ORTHOPAEDIC SURGERY

## 2020-09-22 PROCEDURE — 36415 COLL VENOUS BLD VENIPUNCTURE: CPT | Performed by: ORTHOPAEDIC SURGERY

## 2020-09-22 PROCEDURE — 97116 GAIT TRAINING THERAPY: CPT | Mod: GP | Performed by: PHYSICAL THERAPIST

## 2020-09-22 PROCEDURE — 25000128 H RX IP 250 OP 636: Performed by: ORTHOPAEDIC SURGERY

## 2020-09-22 PROCEDURE — 82947 ASSAY GLUCOSE BLOOD QUANT: CPT | Performed by: ORTHOPAEDIC SURGERY

## 2020-09-22 RX ADMIN — MINOXIDIL 5 MG: 2.5 TABLET ORAL at 08:24

## 2020-09-22 RX ADMIN — IPRATROPIUM BROMIDE 2 SPRAY: 21 SPRAY NASAL at 08:27

## 2020-09-22 RX ADMIN — ACETAMINOPHEN 975 MG: 325 TABLET, FILM COATED ORAL at 00:17

## 2020-09-22 RX ADMIN — Medication 1 CAPSULE: at 08:24

## 2020-09-22 RX ADMIN — ATENOLOL 100 MG: 100 TABLET ORAL at 08:24

## 2020-09-22 RX ADMIN — SPIRONOLACTONE 25 MG: 25 TABLET ORAL at 08:24

## 2020-09-22 RX ADMIN — CEFAZOLIN 1 G: 1 INJECTION, POWDER, FOR SOLUTION INTRAMUSCULAR; INTRAVENOUS at 03:38

## 2020-09-22 RX ADMIN — ACETAMINOPHEN 975 MG: 325 TABLET, FILM COATED ORAL at 08:24

## 2020-09-22 NOTE — PLAN OF CARE
Date/Time 9/21/2020 3753-3029    Trauma/Ortho/Medical (Choose one) Ortho    Diagnosis: Lumbar 4-sacral decompression performed (Spinal stenosis, lumbar region with neurogenic claudication)   POD#: 1  Mental Status: A & O x 4  Activity/dangle: SBA  Diet: Regular  Pain: Denies  Jensen/Voiding: Jensen removed this AM, due to void  Tele/Restraints/Iso: NA  02/LDA: PIV SL  D/C Date: Possibly discharging today  Other Info: Dressing changed, CMS intact. Incision still bleeding, but continued to slow down throughout shift. Dressing changed x 3. Hemovac WNL. Denies nausea, dizziness, lightheadedness. Ice applied.

## 2020-09-22 NOTE — PLAN OF CARE
Discharge Planner OT   Patient plan for discharge: Defer to PT  Current status: Per PT, patient is independent with ADLs and mobility and does not have OT needs.  Barriers to return to prior living situation: Defer to PT  Recommendations for discharge: Defer to PT  Rationale for recommendations: OT orders completed; no inpatient or home OT needs indicated.         Entered by: Zahida Perez 09/22/2020 11:13 AM

## 2020-09-22 NOTE — PLAN OF CARE
Pt discharging home at this time with all his belongings accompanied by pt's daughter. AVS and education given. Questions answered.

## 2020-09-22 NOTE — DISCHARGE INSTRUCTIONS
TAKE HOME INSTRUCTIONS FOLLOWING A LUMBAR  MICRODISCECTOMY, LAMINECTOMY OR DECOMPRESSION  Jan Ferraro M.D.  [facility]  [phone]     The following instructions are general guidelines which apply if you have had a lumbar (low back) microdiscectomy, laminectomy or decompression. If you have further questions after reviewing these instructions, please feel free to contact Janine Pathak R.N. at [phone].      ACTIVITY:    Walking plays a very important role in your recovery. We encourage you to begin taking short walks as soon as you are discharged from the hospital, increasing your distance and time each day. Two to three weeks after surgery you may want to vary walking with other forms of aerobic activity, such as riding a stationary bike, using a Nordic Track or Stairmaster machine. One of these forms of aerobic activity may be more comfortable than another. You have to experiment and see which one works best for you.  What is most important is that you begin SOME form of aerobic exercise.  Your goal for 6 weeks after surgery should be a minimum of 30 minutes, 3 times a week. Two weeks after surgery you may also begin some gentle stretching exercises.     Your discs are the soft, cushioning material between each vertebra (bone) in your back. Aerobic exercise helps with the nutrition of the discs in addition to strengthening the muscles that support your back.     INCISION/DRESSING:    You have dissolvable sutures beneath the skin, which do not need to be removed. The tape-like strips across your incision are called steri-strips. They will eventually fall off on their own and if they haven t done so by the time you come in for your post-operative appointment, we will remove them for you. For your own comfort you may wish to keep a dressing over your incision for a few days. Please inspect your incision once a day and notify our office if there is persistent drainage, swelling and/or  redness.    PAIN/PAIN MEDICATION:    Do not be alarmed if you experience leg pain similar to what you experienced prior to surgery. This is NOT unusual. During surgery Dr. Ferraro worked around the nerve that was compressed which can cause increased nerve irritation. In addition, the disc material that was pressing on the nerve before surgery can be a chemical irritant.     If you experienced numbness or weakness in your leg before surgery, it may take some time for this to resolve, and may never do so completely. Typically pain resolves first, numbness second and weakness third.        Your narcotic pain medication will be refilled up to 4-6 weeks after surgery. Over the course of that period you should gradually decrease the amount of pain medications you take each day. After the pain medication is discontinued, Tylenol or ibuprofen should be enough to keep you comfortable.    BOWELS/CONSTIPATION:    Narcotic pain medication can be very constipating. Make sure you drink plenty of fluids and eat plenty of fresh fruits and vegetables to help decrease constipation. Walking will also help promote normal bowel function. If the constipation continues, you may want to purchase Metamucil or an over the counter suppository at your pharmacy.    WORK:     Please consult with Dr. Ferraro as to when you may return to work. Generally, if your job is sedentary, as opposed to physically strenuous, you may return as soon as you wish.    GENERAL DO S AND DON TS:      You may shower once you are discharged from the hospital. You do not need to cover your incision, but do remove your dressing prior to showering.       You may soak in a tub or Jacuzzi after your first post-operative appointment if your incision is healed and not draining.      Avoid excessive bending, twisting and lifting more than 10 pounds for 2 weeks.      Avoid sitting for more than 1 hour at a time for 2 weeks. If you need to sit longer than 1 hour at a time,  make sure you get up and walk around for a few minutes.      You may drive when you are comfortable to do so AND you are no longer taking narcotic pain medication.      You may resume sexual activity as comfort allows.      If you develop a fever greater than 100.6(F) which lasts more than 2 days, please contact our office.      If you develop difficulties controlling your bowel and/or bladder, please contact our office.    After discharge, please contact our office to schedule your follow-up appointment for 10-14 days after surgery.

## 2020-09-22 NOTE — PLAN OF CARE
Pt is AOx3, forgetful, impulsive and confused at times, CMS intact, VSS, dressing moist moderate drainage bleeding from incisional site, dressing changed, ice applied, bleeding slowing down towards end of shift, denies pain, ambulated in chavez assist 1, baseline numbness BL foot, Jensen patent, adequate output, tolerating diet, HVC wnl. Will cont to monitor.

## 2020-09-22 NOTE — PLAN OF CARE
Discharge Planner PT   Patient plan for discharge: Home with assist of daughter and wife for IADL's as needed.   Current status: Patient and daughter educated on Proper posture handout and provided with a copy. After education, patient able to demonstrate independence with bed mobility and transfers following spine precautions. Patient is independent with amb on level surface and modified independent on steps using a rail but does need reminders to slow down as he is somewhat impulsive. Daughter very aware of this and familiar already with providing these cues. Patient was able to demonstrate how to maintain precautions while donning his socks and pants and daughter reports she and/or her mom will assist as needed. Family will assist with bathing if needed and other IADL's to allow patient to maintain spine precautions.   Barriers to return to prior living situation: none  Recommendations for discharge: home with assist of wife and daughter for IADL's in order to maintain spine precautions.   They will assist with dressing as needed too.   Rationale for recommendations: Patient is able to demonstrate independent with functional mobility and has the assist needed at home for ADL's and IADL's  to allow him to maintain spine precautions.        Entered by: Grace Fleming 09/22/2020 10:58 AM

## 2020-09-22 NOTE — PROGRESS NOTES
09/22/20 1002   Quick Adds   Type of Visit Initial PT Evaluation   Living Environment   Lives With spouse   Living Arrangements house   Home Accessibility stairs to enter home;stairs within home   Number of Stairs, Main Entrance 3   Stair Railings, Main Entrance railings safe and in good condition;railing on left side (ascending)   Number of Stairs, Within Home, Primary   (Flight to upstairs and basement but he doesn't have to use. )   Transportation Anticipated car, drives self;family or friend will provide  (Wife drives most of the time. )   Self-Care   Usual Activity Tolerance good   Activity/Exercise/Self-Care Comment Patient has been very active, Played hockey until 67 and then tennis until 82.    Functional Level Prior   Ambulation 0-->independent   Transferring 0-->independent   Toileting 0-->independent   Bathing 0-->independent   Communication 0-->understands/communicates without difficulty   Swallowing 0-->swallows foods/liquids without difficulty   Cognition 0 - no cognition issues reported   Fall history within last six months yes   Number of times patient has fallen within last six months 3   Which of the above functional risks had a recent onset or change? none   General Information   Onset of Illness/Injury or Date of Surgery - Date 09/21/20   Referring Physician Jan Ferraro MD    Patient/Family Goals Statement To go home this morning.    Pertinent History of Current Problem (include personal factors and/or comorbidities that impact the POC) Per chart Partial L3, L4, L5 laminectomy with bilateral hemifacetectomies, foraminotomies, excision of calcified epidural facet cyst, right L5 to S1.    Precautions/Limitations fall precautions;spinal precautions   General Observations Patient daughter present and very supportive. She is a nurse and will be assisting at home as needed. She lives 1 mile from patient.    General Info Comments Per patient his wife is able to assist as needed.    Cognitive  Status Examination   Orientation orientation to person, place and time   Level of Consciousness alert   Follows Commands and Answers Questions 100% of the time   Personal Safety and Judgment intact   Memory intact   Pain Assessment   Patient Currently in Pain No   Integumentary/Edema   Integumentary/Edema Comments Incision covered by a bandage.    Posture    Posture Comments Not impaired in sitting.    Range of Motion (ROM)   ROM Comment Trunk range limited by spine precautions.    Strength   Strength Comments Reports bilateral rotator cuff tears that he never had fixed but he can lift both arms against gravity through full range.    Bed Mobility   Bed Mobility Comments Educated on technique using spine precautions and independent once cued.    Transfer Skills   Transfer Comments Sit to stand independent.    Gait   Gait Comments Gait on level surface with supervision and needed cues to slow down. 1 LOB requiring assist to correct.    Balance   Balance Comments Good sitting balance and good static standing balance. Patient tends to move very quickly and lost his balance one time when turning quickly to put his shirt on. His daughter helped him correct his balance.    General Therapy Interventions   Planned Therapy Interventions bed mobility training;gait training;other (see comments)   Intervention Comments Education on spine precautions and proper posture.    Clinical Impression   Criteria for Skilled Therapeutic Intervention yes, treatment indicated   PT Diagnosis Not knowledgeable about spine precuaitons. Decreased balance.   Influenced by the following impairments Spine precautions impulsive.    Functional limitations due to impairments Needed education on proper posture and spine precautions, cues to slow down.    Clinical Presentation Stable/Uncomplicated   Clinical Decision Making (Complexity) Low complexity   Therapy Frequency   (One time eval and treat.)   Predicted Duration of Therapy Intervention  "(days/wks) One time eval and treat   Anticipated Discharge Disposition Home with Assist  (Wife and daughter to assist with IADL's. )   Risk & Benefits of therapy have been explained Yes   Patient, Family & other staff in agreement with plan of care Yes   [facility] TM \"6 Clicks\"   2016, Trustees of [facility], under license to [employer].  All rights reserved.   6 Clicks Short Forms Basic Mobility Inpatient Short Form   [facility]  \"6 Clicks\" V.2 Basic Mobility Inpatient Short Form   1. Turning from your back to your side while in a flat bed without using bedrails? 4 - None   2. Moving from lying on your back to sitting on the side of a flat bed without using bedrails? 4 - None   3. Moving to and from a bed to a chair (including a wheelchair)? 4 - None   4. Standing up from a chair using your arms (e.g., wheelchair, or bedside chair)? 4 - None   5. To walk in hospital room? 4 - None   6. Climbing 3-5 steps with a railing? 4 - None   Basic Mobility Raw Score (Score out of 24.Lower scores equate to lower levels of function) 24   Total Evaluation Time   Total Evaluation Time (Minutes) 8     "

## 2020-09-22 NOTE — DISCHARGE SUMMARY
Admit Date:     2020   Discharge Date:           DISCHARGE DIAGNOSIS:  Severe spinal stenosis L2 through S1.      PROCEDURE:  Partial L3, L4, L5 laminectomy, excision of calcified epidural facet cyst on the right at L5-S1.      HISTORY OF PRESENT ILLNESS/HOSPITAL COURSE:  Benjamin Raines is an 85-year-old gentleman who has developed significant neurogenic claudication.  Studies have demonstrated marked stenosis with a complete block at the L4-5 level.  Because of failure to respond to nonoperative management, it was elected he undergo the aforementioned procedure.  Postoperatively, he had marked improvement in his leg pain.  He was gradually mobilized.  He was up independently with his wound healing nicely and neurologically intact.      DISCHARGE DISPOSITION:  Discharged to home where he will be encouraged to continue with his walking program.  He will return for followup in approximately 2 weeks' time.  He will gradually resume his Coumadin.         CALLI CANO MD             D: 2020   T: 2020   MT: DYLAN      Name:     BENJAMIN RICHARDSON   MRN:      [id number]        Account:        [id number]   :      1935           Admit Date:     2020                                  Discharge Date:       Document: [id number]       cc: [facility]

## (undated) DEVICE — SU VICRYL 0 CT-2 27" J334H

## (undated) DEVICE — WIPES FOLEY CARE SURESTEP PROVON DFC100

## (undated) DEVICE — SPONGE KITTNER 31001010

## (undated) DEVICE — Device

## (undated) DEVICE — PACK SPINE SM CUSTOM SNE15SSFSK

## (undated) DEVICE — SU STRATAFIX PDS PLUS 2-0 SPIRAL CT-1 30CM SXPP1B410

## (undated) DEVICE — TAPE MEDIPORE 6"X10YD 2966

## (undated) DEVICE — POSITIONER PT PRONESAFE HEAD REST W/DERMAPROX INSERT 40599

## (undated) DEVICE — GLOVE PROTEXIS BLUE W/NEU-THERA 7.0  2D73EB70

## (undated) DEVICE — ESU GROUND PAD UNIVERSAL W/O CORD

## (undated) DEVICE — DRAPE SHEET REV FOLD 3/4 9349

## (undated) DEVICE — DRAIN ROUND W/RESERV KIT JACKSON PRATT 10FR 400ML SU130-402D

## (undated) DEVICE — SU VICRYL 3-0 PS-1 18" UND J683

## (undated) DEVICE — PREP CHLORAPREP 26ML TINTED ORANGE  260815

## (undated) DEVICE — SPONGE SURGIFOAM 12 1972

## (undated) DEVICE — GLOVE PROTEXIS MICRO 7.5  2D73PM75

## (undated) DEVICE — GLOVE PROTEXIS W/NEU-THERA 7.0  2D73TE70

## (undated) DEVICE — SU VICRYL 0 CT-1 CR 8X18" J740D

## (undated) DEVICE — BLADE CLIPPER SGL USE 9680

## (undated) DEVICE — SPONGE RAY-TEC 4X8" 7318

## (undated) DEVICE — GLOVE PROTEXIS POWDER FREE 8.0 ORTHOPEDIC 2D73ET80

## (undated) DEVICE — SU VICRYL 2-0 CT-2 27" J333H

## (undated) DEVICE — KIT PATIENT JACKSON 1 SPK10118

## (undated) DEVICE — SOL WATER IRRIG 1000ML BOTTLE 2F7114

## (undated) DEVICE — GLOVE PROTEXIS BLUE W/NEU-THERA 7.5  2D73EB75

## (undated) DEVICE — DRAPE MICROSOPE ZEISS 52X150" 6120

## (undated) DEVICE — LINEN TOWEL PACK X5 5464

## (undated) DEVICE — SOL NACL 0.9% IRRIG 1000ML BOTTLE 2F7124

## (undated) DEVICE — CATH TRAY FOLEY COUDE SURESTEP 16FR W/URNE MTR STLK A304716A

## (undated) DEVICE — MANIFOLD NEPTUNE 4 PORT 700-20

## (undated) RX ORDER — LIDOCAINE HYDROCHLORIDE 20 MG/ML
INJECTION, SOLUTION EPIDURAL; INFILTRATION; INTRACAUDAL; PERINEURAL
Status: DISPENSED
Start: 2020-09-21

## (undated) RX ORDER — CEFAZOLIN SODIUM 2 G/100ML
INJECTION, SOLUTION INTRAVENOUS
Status: DISPENSED
Start: 2020-09-21

## (undated) RX ORDER — PROPOFOL 10 MG/ML
INJECTION, EMULSION INTRAVENOUS
Status: DISPENSED
Start: 2020-09-21

## (undated) RX ORDER — FENTANYL CITRATE 0.05 MG/ML
INJECTION, SOLUTION INTRAMUSCULAR; INTRAVENOUS
Status: DISPENSED
Start: 2020-09-21

## (undated) RX ORDER — HYDROMORPHONE HYDROCHLORIDE 1 MG/ML
INJECTION, SOLUTION INTRAMUSCULAR; INTRAVENOUS; SUBCUTANEOUS
Status: DISPENSED
Start: 2020-09-21

## (undated) RX ORDER — CEFAZOLIN SODIUM 1 G/3ML
INJECTION, POWDER, FOR SOLUTION INTRAMUSCULAR; INTRAVENOUS
Status: DISPENSED
Start: 2020-09-21

## (undated) RX ORDER — FENTANYL CITRATE 50 UG/ML
INJECTION, SOLUTION INTRAMUSCULAR; INTRAVENOUS
Status: DISPENSED
Start: 2020-09-21

## (undated) RX ORDER — ONDANSETRON 2 MG/ML
INJECTION INTRAMUSCULAR; INTRAVENOUS
Status: DISPENSED
Start: 2020-09-21

## (undated) RX ORDER — DEXAMETHASONE SODIUM PHOSPHATE 4 MG/ML
INJECTION, SOLUTION INTRA-ARTICULAR; INTRALESIONAL; INTRAMUSCULAR; INTRAVENOUS; SOFT TISSUE
Status: DISPENSED
Start: 2020-09-21

## (undated) RX ORDER — METHYLPREDNISOLONE ACETATE 40 MG/ML
INJECTION, SUSPENSION INTRA-ARTICULAR; INTRALESIONAL; INTRAMUSCULAR; SOFT TISSUE
Status: DISPENSED
Start: 2020-09-21

## (undated) RX ORDER — NEOSTIGMINE METHYLSULFATE 1 MG/ML
VIAL (ML) INJECTION
Status: DISPENSED
Start: 2020-09-21

## (undated) RX ORDER — BUPIVACAINE HYDROCHLORIDE AND EPINEPHRINE 5; 5 MG/ML; UG/ML
INJECTION, SOLUTION EPIDURAL; INTRACAUDAL; PERINEURAL
Status: DISPENSED
Start: 2020-09-21

## (undated) RX ORDER — GLYCOPYRROLATE 0.2 MG/ML
INJECTION, SOLUTION INTRAMUSCULAR; INTRAVENOUS
Status: DISPENSED
Start: 2020-09-21

## (undated) RX ORDER — VECURONIUM BROMIDE 1 MG/ML
INJECTION, POWDER, LYOPHILIZED, FOR SOLUTION INTRAVENOUS
Status: DISPENSED
Start: 2020-09-21